# Patient Record
(demographics unavailable — no encounter records)

---

## 2024-11-05 NOTE — REASON FOR VISIT
[Cardiac Failure] : cardiac failure [Coronary Artery Disease] : coronary artery disease [FreeTextEntry1] : preoperative cardiac clearance

## 2024-11-05 NOTE — DISCUSSION/SUMMARY
[FreeTextEntry1] : 61 year old man status post VA ECMO in setting of pRCA STEMI.   CAD IWSTEMI Dec/2022 s/p LUCERO to pRCA w/ the hospital course c/b cardiogenic shock s/p VA ECMO and IABP and VT/VF s/p defibrillation. Last blood work with high TAG but didn't fast. LDL 30. Asymptomatic at this time. Residual moderate LAD and Cx disease s/p recent LHC.  -c/w aspirin, ticagrelor discontinued now more than 1 year  -NST recently done without LAD ischemia, all fixed. Cath also with non-obstructive disease.  -continue statin 40 mg daily (repeat labs)   ICM HFrEF recovered to 53% and RV normal -decrease from 25 mg to off metoprolol  -c/w losartan 25 mg daily    R axillary and brachial vein thrombosis, catheter induced -patient following with Dr. Wren from vascular   Patient to follow up in 3 months after blood work    [EKG obtained to assist in diagnosis and management of assessed problem(s)] : EKG obtained to assist in diagnosis and management of assessed problem(s)

## 2024-11-05 NOTE — HISTORY OF PRESENT ILLNESS
[FreeTextEntry1] : WEI LOPES is a 61 year old man status s/p prolonged hospital stay in 12/2022 post late presentation STEMI, ECMO cannulation in setting of refractory VT/VF, HFrEF He still has paralysis issues of vocal cords since the prolonged intubation. Considering some laser of the scar tissue of the cords.   Overall, he is doing well. Denies any chest pain. He is doing walking now, but after exerting 2 blocks like across the street.  No PND or orthopnea. Doesn't use incentive spirometer. No edema in legs.  Has started doing more exercise. Broken a sweat but does feel slightly winded due to vocal cords. Does go 20-30 minutes on a bike without issues.  Recent NST without reversible ischemia S/p ENT procedure.   He recently was admitted to [facility] with chest pain, Underwent LHC with non-obstructive CAD.  Felt to be pericarditis and sent home.  LDL 54, A1c 5.4   His last visit, metoprolol was supposed to be stopped with him remaining losartan. Unfortunately stayed on metoprolol, thus slightly elevated blood pressure.  Recent ENT surgery.

## 2024-11-05 NOTE — CARDIOLOGY SUMMARY
[de-identified] : 11/2023 NST: Perfusion: Qualitative Findings: Severe fixed inferoseptal, inferior and inferolateral wall segments to the distal inferior wall consistent with a medium sized area of infarction. No ischemia seen. Ventricular Function: The stress left ventricular EF% is 60 %. The stress end diastolic volume is 87 ml and systolic volume is 35 ml. [de-identified] : 5/2024 TTE: 1. Left ventricular cavity is normal in size. Left ventricular wall thickness is normal. Left ventricular systolic function is low normal. Regional wall motion abnormalities present.  2. Basal and mid inferior wall and basal inferoseptal segment are abnormal.  1/27/2023 TTE: Dimensions:    Normal Values: LA:     3.1    2.0 - 4.0 cm Ao:     3.1    2.0 - 3.8 cm SEPTUM: 0.9    0.6 - 1.2 cm PWT:    0.8    0.6 - 1.1 cm LVIDd:  4.3    3.0 - 5.6 cm LVIDs:  3.4    1.8 - 4.0 cm Derived variables: LVMI: 73 g/m2 RWT: 0.37 Fractional short: 21 % EF (Peace Rule): 53 %Doppler Peak Velocity (m/sec): AoV=1.3 ------------------------------------------------------------------------ Observations: Mitral Valve: Normal mitral valve. Aortic Valve/Aorta: Calcified trileaflet aortic valve with normal opening. Aortic Root: 3.1 cm. LVOT diameter: 1.9 cm. Left Atrium: Normal left atrium.  LA volume index = 16 cc/m2. Left Ventricle: Mild  segmental left ventricular systolic dysfunction. Endocardial visualization enhanced with intravenous injection of Ultrasonic Enhancing Agent (Definity). No left ventricular thrombus. The mid anterior septum, the basal anterior septum, the basal inferior wall, the mid inferior wall, the mid inferoseptum, and the basal inferoseptum are hypokinetic.  Normal left ventricular internal dimensions and wall thicknesses. Unable to evaluate diastology. Right Heart: Normal right atrium. The right ventricle is not well visualized; grossly normal right ventricular systolic function. Tricuspid valve not well visualized. Pulmonic valve not well visualized. Pericardium/Pleura: Normal pericardium with no pericardial effusion. Hemodynamic: IVC is not well visualized. ------------------------------------------------------------------------ Conclusions: 1. Mild  segmental left ventricular systolic dysfunction. Endocardial visualization enhanced with intravenous injection of Ultrasonic Enhancing Agent (Definity). No left ventricular thrombus. The mid anterior septum, the basal anterior septum, the basal inferior wall, the mid inferior wall, the mid inferoseptum, and the basal inferoseptum are hypokinetic. 2. The right ventricle is not well visualized; grossly normal right ventricular systolic function. [de-identified] : 12/7/2022 PCi:\par  \par  Conclusions: \par  1. Culprit for late presentation inferior wall MI (>24 hours old) and\par  ventricular fibrillation arrest is proximal RCA.\par  \par  2. Patient is in refractory cardiogenic shock as evidenced by SBP <80\par  on norepineprine, dobutamine, vasopressin and\par  epinephrine on ECMO support.  \par  3. Successul rescue PCI of the pRCA using 2 LUCERO with DAISY 3 flow after\par  intervention, after unsuccssful lytic therapy from OSH.\par  Recommendations: \par  \par  1. Continue cangrelor antiplatelet IV infusion therapy as well as\par  argotroban.\par  2. Continue ECMO for hemodynamic support with close HF, CTU, CTS care \par  3. Return to CTU for intensive medical care and wean pressors as\par  tolerated\par  4. Discussed with Dr. Sen of CTS and Dr. Jiménez of [facility]  \par  Acute complication:    No complications \par

## 2024-11-06 NOTE — CARDIOLOGY SUMMARY
[de-identified] : 11/2023 NST: Perfusion: Qualitative Findings: Severe fixed inferoseptal, inferior and inferolateral wall segments to the distal inferior wall consistent with a medium sized area of infarction. No ischemia seen. Ventricular Function: The stress left ventricular EF% is 60 %. The stress end diastolic volume is 87 ml and systolic volume is 35 ml. [de-identified] : 5/2024 TTE: 1. Left ventricular cavity is normal in size. Left ventricular wall thickness is normal. Left ventricular systolic function is low normal. Regional wall motion abnormalities present.  2. Basal and mid inferior wall and basal inferoseptal segment are abnormal.  1/27/2023 TTE: Dimensions:    Normal Values: LA:     3.1    2.0 - 4.0 cm Ao:     3.1    2.0 - 3.8 cm SEPTUM: 0.9    0.6 - 1.2 cm PWT:    0.8    0.6 - 1.1 cm LVIDd:  4.3    3.0 - 5.6 cm LVIDs:  3.4    1.8 - 4.0 cm Derived variables: LVMI: 73 g/m2 RWT: 0.37 Fractional short: 21 % EF (Peace Rule): 53 %Doppler Peak Velocity (m/sec): AoV=1.3 ------------------------------------------------------------------------ Observations: Mitral Valve: Normal mitral valve. Aortic Valve/Aorta: Calcified trileaflet aortic valve with normal opening. Aortic Root: 3.1 cm. LVOT diameter: 1.9 cm. Left Atrium: Normal left atrium.  LA volume index = 16 cc/m2. Left Ventricle: Mild  segmental left ventricular systolic dysfunction. Endocardial visualization enhanced with intravenous injection of Ultrasonic Enhancing Agent (Definity). No left ventricular thrombus. The mid anterior septum, the basal anterior septum, the basal inferior wall, the mid inferior wall, the mid inferoseptum, and the basal inferoseptum are hypokinetic.  Normal left ventricular internal dimensions and wall thicknesses. Unable to evaluate diastology. Right Heart: Normal right atrium. The right ventricle is not well visualized; grossly normal right ventricular systolic function. Tricuspid valve not well visualized. Pulmonic valve not well visualized. Pericardium/Pleura: Normal pericardium with no pericardial effusion. Hemodynamic: IVC is not well visualized. ------------------------------------------------------------------------ Conclusions: 1. Mild  segmental left ventricular systolic dysfunction. Endocardial visualization enhanced with intravenous injection of Ultrasonic Enhancing Agent (Definity). No left ventricular thrombus. The mid anterior septum, the basal anterior septum, the basal inferior wall, the mid inferior wall, the mid inferoseptum, and the basal inferoseptum are hypokinetic. 2. The right ventricle is not well visualized; grossly normal right ventricular systolic function. [de-identified] : 12/7/2022 PCi:\par  \par  Conclusions: \par  1. Culprit for late presentation inferior wall MI (>24 hours old) and\par  ventricular fibrillation arrest is proximal RCA.\par  \par  2. Patient is in refractory cardiogenic shock as evidenced by SBP <80\par  on norepineprine, dobutamine, vasopressin and\par  epinephrine on ECMO support.  \par  3. Successul rescue PCI of the pRCA using 2 LUCERO with DAISY 3 flow after\par  intervention, after unsuccssful lytic therapy from OSH.\par  Recommendations: \par  \par  1. Continue cangrelor antiplatelet IV infusion therapy as well as\par  argotroban.\par  2. Continue ECMO for hemodynamic support with close HF, CTU, CTS care \par  3. Return to CTU for intensive medical care and wean pressors as\par  tolerated\par  4. Discussed with Dr. Sen of CTS and Dr. Jiménez of [facility]  \par  Acute complication:    No complications \par

## 2024-11-06 NOTE — HISTORY OF PRESENT ILLNESS
[FreeTextEntry1] : WEI LOPES is a 61 year old man status s/p prolonged hospital stay in 12/2022 post late presentation STEMI, ECMO cannulation in setting of refractory VT/VF, HFrEF He still has paralysis issues of vocal cords since the prolonged intubation. Considering some laser of the scar tissue of the cords.   Overall, he is doing well. Denies any chest pain. He is doing walking now, but after exerting 2 blocks like across the street.  No PND or orthopnea. Doesn't use incentive spirometer. No edema in legs.  Has started doing more exercise. Broken a sweat but does feel slightly winded due to vocal cords. Does go 20-30 minutes on a bike without issues.  Recent NST without reversible ischemia S/p ENT procedure.   He recently was admitted to [facility] with chest pain, Underwent LHC with non-obstructive CAD.  Felt to be pericarditis and sent home.  LDL 54, A1c 5.4  His last visit, metoprolol was supposed to be stopped with him remaining losartan. Unfortunately stayed on metoprolol, thus slightly elevated blood pressure.  Recent ENT surgery.  11/6/24 Pt presents as emergent patient due to feeling  " funny" hypertensive for two days now at home which is similar to when he had his heart attack in 2022. last night hours after exercising he became diaphoretic, no CP but not feeling right, he didn't want to call 911, todays episode was on a much smaller scale. He had 4 days ago had a dilation/ scar tissue removal of trachea post being intubated in 2022. He has very high stress in his life, being unable to work, living situation is not ideal, his anxiety is very high. Pt has had elevated B/P since stopping metoprolol.

## 2024-11-06 NOTE — DISCUSSION/SUMMARY
[FreeTextEntry1] : 61 year old man status post VA ECMO in setting of pRCA STEMI.   CAD IWSTEMI Dec/2022 s/p LUCERO to pRCA w/ the hospital course c/b cardiogenic shock s/p VA ECMO and IABP and VT/VF s/p defibrillation. Last blood work with high TAG but didn't fast. LDL 30. Asymptomatic at this time. Residual moderate LAD and Cx disease s/p recent LHC.  -c/w aspirin, ticagrelor discontinued now more than 1 year  -NST recently done without LAD ischemia, all fixed. Cath also with non-obstructive disease.  -continue statin 40 mg daily (repeat labs)   ICM HFrEF recovered to 53% and RV normal -Restart  metoprolol 25mg QD  c/w losartan 25 mg daily   R axillary and brachial vein thrombosis, catheter induced -patient following with Dr. Wren from vascular    Patient to follow up in 3 months after blood work

## 2024-12-30 NOTE — ASSESSMENT
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  He again will not decide between these two options and is still asking for a third option and will reach out to Dr. Tanner again.

## 2024-12-30 NOTE — PROCEDURE
[de-identified] : Stroboscopic Laryngoscopy Procedure Note:  Indication:	Assess laryngeal biomechanics and vocal fold oscillation.  Description of Procedure:	Informed consent was verbally obtained from the patient prior to the procedure. The patient was seated in the clinic chair. Topical anesthesia was achieved by first spraying the nasal cavities with 4% lidocaine and nasal decongestant.   Findings:  Supraglottis: no masses or lesions  Glottis:    Structure:                        Right: crisp and shows no lesions or masses, supraglottic posterior glottic stenosis                       Left:  crisp and shows no lesions or masses                 Mobility:                        Right:  absent                       Left:  absent                Amplitude:                        Right:  normal                       Left:  normal                Closure: complete                 Wave symmetry:  symmetric  Subglottis: no masses or lesions within the visualized subglottis Visualized airway is widely patent.

## 2024-12-30 NOTE — HISTORY OF PRESENT ILLNESS
[de-identified] : WEI LOPES is a 62 year old male who presents to the [facility] for follow up of his bilateral vocal fold immobility, PGS, adrenal insufficiency, and prior MI. I last saw the patient on 6/4/24. He has since gotten 2nd opinion from Dr. Tanner. Breathing has been worsened since last visit. Having SOB especially worse when he wakes up in the middle of the night and during exercise. Voice has been stable but having frequent urge to clear throat. Sometimes he's able to expectorate mucous. Complains of globus when swallowing. Patient is schedule for DL, incision of possible PGS, possible steroid injection, and laryngeal dilation on 9/20/24. Tentative plan for repat surgery in approx 6 weeks post op. States he is having SOB, much worse with activity. Swallowing is getting worse. Voice is hoarse. States voice was worse after second surgery with [facility].  States was suppose to follow up 6 months after 2nd surgery (which was Nov 2024). States got minimal improvement in breathing for short period of time after 2nd surgery but now much worse.   Previously reported: bilateral vocal fold paralysis. Patient is referred by Dr. Nik Francis who first saw the patient on 4/4/23. This was patient was first identified with bilateral vocal fold immobility and instructed to go to ED but patient elected against this and instead was given oral steroid taper for stridor - currently on 7th day. States breathing is better now than 7 days prior. PMH is significant for Myocardial Infarct 12/6/22 - s/p stent placement at [facility]. States he was intubated followed by trach and decannulation. Reports he was on ECMO- and discharged home after 2/15/23. No dysphagia. Describes his voice as more hoarse. Their vocal fold paralysis is thought to be from recent hospitalization. No oxygen requirements. Their vocal fold paralysis was first noted on after being discharged from [facility]. They describe their current voice has slightly improved since initial presentation - persistent hoarseness Denies complete loss of voice - increased effort and work to speak Denies having issues drinking liquids. He has not worked with a speech language pathologist for this problem. Denies having prior procedures/surgeries on their vocal cords. 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 Healy

## 2025-01-14 NOTE — HISTORY OF PRESENT ILLNESS
[de-identified] : WEI LOPES is a 62 year old male who presents to the [facility] for follow up of his bilateral vocal fold immobility, PGS, adrenal insufficiency, and prior MI. I last saw the patient during his recent hospitalization. This is his 1st POA. Many questions were answered today and reviewed multiple times.  Several pages of instructions were written down for him to take home.     Vitals from today's visit checked by myself personally: /76 SpO2: 98% HR 76  Previously reported: bilateral vocal fold paralysis. Patient is referred by Dr. Nik Francis who first saw the patient on 4/4/23. This was patient was first identified with bilateral vocal fold immobility and instructed to go to ED but patient elected against this and instead was given oral steroid taper for stridor - currently on 7th day. States breathing is better now than 7 days prior. PMH is significant for Myocardial Infarct 12/6/22 - s/p stent placement at [facility]. States he was intubated followed by trach and decannulation. Reports he was on ECMO- and discharged home after 2/15/23. No dysphagia. Describes his voice as more hoarse. Their vocal fold paralysis is thought to be from recent hospitalization. No oxygen requirements. Their vocal fold paralysis was first noted on after being discharged from [facility]. They describe their current voice has slightly improved since initial presentation - persistent hoarseness Denies complete loss of voice - increased effort and work to speak Denies having issues drinking liquids. He has not worked with a speech language pathologist for this problem. Denies having prior procedures/surgeries on their vocal cords. 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 Healy 2024: DL, incision of posterior glottic stenosis, steroid injection; Dr. Tanner  2024: DL, incision of posterior glottic stenosis, steroid injection; Dr. Tanner  1/1/25: Tracheotomy; Dr. Healy

## 2025-01-14 NOTE — PROCEDURE
[de-identified] : Stroboscopic Laryngoscopy Procedure Note: Indication: Assess laryngeal biomechanics and vocal fold oscillation. Description of Procedure: Informed consent was verbally obtained from the patient prior to the procedure. The patient was seated in the clinic chair. Topical anesthesia was achieved by first spraying the nasal cavities with 4% lidocaine and nasal decongestant.  Findings: Supraglottis: no masses or lesions Glottis: Structure:  Right: crisp and shows no lesions or masses, supraglottic posterior glottic stenosis  Left: crisp and shows no lesions or masses  Mobility:  Right: absent  Left: absent  Amplitude:  Right: normal  Left: normal  Closure: complete  Wave symmetry: symmetric Subglottis: no masses or lesions within the visualized subglottis Visualized airway is widely patent.  Procedure: Transcervical/trans-stomal flexible tracheoscopy   Exam: The laryngoscope was first placed through the tracheotomy tube in place. This showed that the tracheostomy tube was resting in good position. There was moderate erythema of the trachealis with mild blood cacked on entirety of trachealis. The trachea was adequately dry throughout. No purulence seen.

## 2025-01-14 NOTE — PHYSICAL EXAM
[Normal] : mucosa is normal [Midline] : trachea located in midline position [de-identified] : trach faceplate in appropriate position

## 2025-01-14 NOTE — ASSESSMENT
[FreeTextEntry1] : Assessment/Plan: #1 Bilateral vocal fold immobility #2 Posterior glottic stenosis #3 Dysphonia #4 Prior MI and cardiogenic shock with chronic memory issues #5 Trach dependent #6 Anxiety #7 Presumed acute tracheitis  Patient given Passy Muri valve today. he did not feel very comfortable wearing it even though he was saturating fine and showing no signs of air stacking.  He will mikal as tolerated going forward while awake.  Went in details over all trach maintenance instructions.  Patient was extremely anxious at today's visit and showed difficulty understanding instructions.  This is his baseline due to his prior brain injury during cardiogenic shock over 1 year prior.  As I did not feel he was comfortable enough to manage this trach at this time without assistance I recommended we admit him to the hospital but he refused.  He will use nebulized saline 4-5 times a day.  Stop using red rubber catheter over a dozen times a day, instead instructed on when to use it.  For his presumed mild tracheitis we will start him on Bactrim DS bid for week and start nebulized ciprodex drops 4 drops bid.  Will follow up in 2 days.   Total time: 55 minutes; total time does not include time spent performing the procedure and its related elements. It does include all other face to face and non face to face pre and post visit tasks performed on the same date of service.

## 2025-01-16 NOTE — PROCEDURE
[de-identified] : Procedure: Transnasal flexible laryngoscopy Description: Informed consent was obtained from the patient prior to the procedure. The patient was seated in the clinic chair. Topical anesthesia was achieved by first spraying the nasal cavities with 4% lidocaine and 1% phenylephrine.   Exam: Clear vallecula, crisp epiglottis. Aryepiglottic folds: intact and symmetric bilaterally Hypopharynx: No pooling Interarytenoid space: No lesions or pachydermia False vocal folds: Symmetric and without lesions or masses. False fold voicing (plica ventricularis) is not noted True vocal folds: Immobile bilaterally. No paradoxical motion. Right medial edge is crisp and shows no lesions or masses. Left medial edge is crisp and shows no lesions or masses. Mucosal covering is minimally edematous. No erythema. No obvious vascular ectasias. Vocal processes do not demonstrate granulomas. Subglottis/proximal trachea: clear and unobstructed to the limits of the examination today.   Procedure: Transcervical/trans-stomal flexible tracheoscopy  Exam: The laryngoscope was first placed through the tracheotomy tube in place. This showed that the tracheostomy tube was resting in good position. There was moderate erythema of the trachealis with mild blood cacked on entirety of trachealis. The trachea was adequately dry throughout. No purulence seen.

## 2025-01-16 NOTE — PROCEDURE
[de-identified] : Procedure: Transnasal flexible laryngoscopy Description: Informed consent was obtained from the patient prior to the procedure. The patient was seated in the clinic chair. Topical anesthesia was achieved by first spraying the nasal cavities with 4% lidocaine and 1% phenylephrine.   Exam: Clear vallecula, crisp epiglottis. Aryepiglottic folds: intact and symmetric bilaterally Hypopharynx: No pooling Interarytenoid space: No lesions or pachydermia False vocal folds: Symmetric and without lesions or masses. False fold voicing (plica ventricularis) is not noted True vocal folds: Immobile bilaterally. No paradoxical motion. Right medial edge is crisp and shows no lesions or masses. Left medial edge is crisp and shows no lesions or masses. Mucosal covering is minimally edematous. No erythema. No obvious vascular ectasias. Vocal processes do not demonstrate granulomas. Subglottis/proximal trachea: clear and unobstructed to the limits of the examination today.   Procedure: Transcervical/trans-stomal flexible tracheoscopy  Exam: The laryngoscope was first placed through the tracheotomy tube in place. This showed that the tracheostomy tube was resting in good position. There was moderate erythema of the trachealis with mild blood cacked on entirety of trachealis. The trachea was adequately dry throughout. No purulence seen.

## 2025-01-16 NOTE — HISTORY OF PRESENT ILLNESS
[de-identified] : WEI LOPES is a 63 year old male who presents to the [facility] for follow up of his bilateral vocal fold immobility, PGS, dysphonia, prior MI and cardiogenic shock with chronic memory issues, trach dependent, anxiety, and presumed acute tracheitis.  I last saw the patient on 1/14/25. At that time started on ciprodex drops and Bactrim. Not currently using PMV. He has noticed symptoms are improving.  Continues antibiotics.   Prior Pertinent Procedures: 2/28/24: DL, CO2 laser incision of posterior glottic stenosis, laryngeal balloon dilation, steroid injection; Dr. Healy 2024: DL, incision of posterior glottic stenosis, steroid injection; Dr. Tanner 2024: DL, incision of posterior glottic stenosis, steroid injection; Dr. Tanner 1/1/25: Tracheotomy; Dr. Healy

## 2025-01-16 NOTE — HISTORY OF PRESENT ILLNESS
[de-identified] : WEI LOPES is a 63 year old male who presents to the [facility] for follow up of his bilateral vocal fold immobility, PGS, dysphonia, prior MI and cardiogenic shock with chronic memory issues, trach dependent, anxiety, and presumed acute tracheitis.  I last saw the patient on 1/14/25. At that time started on ciprodex drops and Bactrim. Not currently using PMV. He has noticed symptoms are improving.  Continues antibiotics.   Prior Pertinent Procedures: 2/28/24: DL, CO2 laser incision of posterior glottic stenosis, laryngeal balloon dilation, steroid injection; Dr. Healy 2024: DL, incision of posterior glottic stenosis, steroid injection; Dr. Tanner 2024: DL, incision of posterior glottic stenosis, steroid injection; Dr. Tanner 1/1/25: Tracheotomy; Dr. Healy

## 2025-01-16 NOTE — ASSESSMENT
[FreeTextEntry1] : Assessment/Plan: #1 Bilateral vocal fold immobility #2 Posterior glottic stenosis #3 Dysphonia #4 Prior MI and cardiogenic shock with chronic memory issues #5 Trach dependent #6 Anxiety #7 Presumed acute tracheitis  Recommended he continue the cipro-dex and finish the course of Bactrim. I will see him back on Thursday (1 week). Will continue nebulized saline and humidified air at night.

## 2025-01-23 NOTE — PROCEDURE
[de-identified] : Procedure: Transcervical/trans-stomal flexible tracheoscopy   Exam: The laryngoscope was first placed through the tracheotomy tube in place. This showed that the tracheostomy tube was resting in good position. There was mild erythema of the trachea distal to the tracheostomy tube. The trachea was adequately moist. the laryngoscope evaluated the entirety of the trachea and bilateral mainstem bronchi and no masses or lesions were seen.

## 2025-01-23 NOTE — HISTORY OF PRESENT ILLNESS
[de-identified] :  WEI LOPES is a 63 year old male who presents to the [facility] for follow up of his bilateral vocal fold immobility, PGS, dysphonia, prior MI and cardiogenic shock with chronic memory issues, trach dependent, anxiety, and presumed acute tracheitis. I last saw the patient on 1/16/25. Pt was to continue cipro-dex and finish bactrim. He was to continue nebulized saline and humidified air as well.  Completed Bactrim and continues ciprodex drops with great relief  States breathing and voice has greatly improved. Denies dysphagia. Less bleeding from trach. Still has some white mucous on inner cannula. No foul smelling odors. No recent fevers. Has used PMV with SLP. States voice is stable, requires increased effort to speak.   Prior Pertinent Procedures: 2/28/24: DL, CO2 laser incision of posterior glottic stenosis, laryngeal balloon dilation, steroid injection; Dr. Healy 2024: DL, incision of posterior glottic stenosis, steroid injection; Dr. Tanner 2024: DL, incision of posterior glottic stenosis, steroid injection; Dr. Tanner 1/1/25: Tracheotomy; Dr. Healy

## 2025-01-23 NOTE — PHYSICAL EXAM
[Normal] : mucosa is normal [Midline] : trachea located in midline position [de-identified] : trach plate in place

## 2025-01-23 NOTE — HISTORY OF PRESENT ILLNESS
[de-identified] :  WEI LOPES is a 63 year old male who presents to the [facility] for follow up of his bilateral vocal fold immobility, PGS, dysphonia, prior MI and cardiogenic shock with chronic memory issues, trach dependent, anxiety, and presumed acute tracheitis. I last saw the patient on 1/16/25. Pt was to continue cipro-dex and finish bactrim. He was to continue nebulized saline and humidified air as well.  Completed Bactrim and continues ciprodex drops with great relief  States breathing and voice has greatly improved. Denies dysphagia. Less bleeding from trach. Still has some white mucous on inner cannula. No foul smelling odors. No recent fevers. Has used PMV with SLP. States voice is stable, requires increased effort to speak.   Prior Pertinent Procedures: 2/28/24: DL, CO2 laser incision of posterior glottic stenosis, laryngeal balloon dilation, steroid injection; Dr. Healy 2024: DL, incision of posterior glottic stenosis, steroid injection; Dr. Tanner 2024: DL, incision of posterior glottic stenosis, steroid injection; Dr. Tanner 1/1/25: Tracheotomy; Dr. Healy

## 2025-01-23 NOTE — PHYSICAL EXAM
[Normal] : mucosa is normal [Midline] : trachea located in midline position [de-identified] : trach plate in place

## 2025-01-23 NOTE — ASSESSMENT
[FreeTextEntry1] : Assessment/Plan: #1 Bilateral vocal fold immobility #2 Posterior glottic stenosis #3 Dysphonia #4 Prior MI and cardiogenic shock with chronic memory issues #5 Trach dependent #6 Anxiety #7 Presumed acute tracheitis  Will continue nebulized saline and humidified air at night. I would like him to continue ciprodex nebulizer for 2 more weeks and then stop. I will see him back in 4 weeks. We will order for size 6 cuffless Shiley trach to be delivered every 2 months.   Total time: 30 minutes; total time does not include time spent performing the procedure and its related elements. It does include all other face to face and non face to face pre and post visit tasks performed on the same date of service.

## 2025-01-23 NOTE — PROCEDURE
[de-identified] : Procedure: Transcervical/trans-stomal flexible tracheoscopy   Exam: The laryngoscope was first placed through the tracheotomy tube in place. This showed that the tracheostomy tube was resting in good position. There was mild erythema of the trachea distal to the tracheostomy tube. The trachea was adequately moist. the laryngoscope evaluated the entirety of the trachea and bilateral mainstem bronchi and no masses or lesions were seen.

## 2025-02-24 NOTE — HISTORY OF PRESENT ILLNESS
[de-identified] : WEI LOPES is a 63 year old male who presents to the [facility] for follow up of his bilateral vocal fold immobility, PGS, dysphonia, prior MI and cardiogenic shock with chronic memory issues, trach dependent, and anxiety. I last saw the patient on 1/23/25. He was to continue on nebulized saline and humidified air at night. I wanted him to continue ciprodex nebulizer, weaning off. I wanted to see him back in 4 weeks. We ordered for size 6 cuffless Shiley trach to be delivered every 2 months. Reports some discomfort from trach and sees green, sometimes bloody, excaudate on dressing during changes. Pending speech therapy on 3/14. States voice remains the same from last visit.  Prior Pertinent Procedures: 2/28/24: DL, CO2 laser incision of posterior glottic stenosis, laryngeal balloon dilation, steroid injection; Dr. Healy 2024: DL, incision of posterior glottic stenosis, steroid injection; Dr. Tanner 2024: DL, incision of posterior glottic stenosis, steroid injection; Dr. Tanner 1/1/25: Tracheotomy; Dr. Healy

## 2025-02-24 NOTE — HISTORY OF PRESENT ILLNESS
[de-identified] : WEI LOPES is a 63 year old male who presents to the [facility] for follow up of his bilateral vocal fold immobility, PGS, dysphonia, prior MI and cardiogenic shock with chronic memory issues, trach dependent, and anxiety. I last saw the patient on 1/23/25. He was to continue on nebulized saline and humidified air at night. I wanted him to continue ciprodex nebulizer, weaning off. I wanted to see him back in 4 weeks. We ordered for size 6 cuffless Shiley trach to be delivered every 2 months. Reports some discomfort from trach and sees green, sometimes bloody, excaudate on dressing during changes. Pending speech therapy on 3/14. States voice remains the same from last visit.  Prior Pertinent Procedures: 2/28/24: DL, CO2 laser incision of posterior glottic stenosis, laryngeal balloon dilation, steroid injection; Dr. Healy 2024: DL, incision of posterior glottic stenosis, steroid injection; Dr. Tanner 2024: DL, incision of posterior glottic stenosis, steroid injection; Dr. Tanner 1/1/25: Tracheotomy; Dr. Healy

## 2025-02-24 NOTE — ASSESSMENT
[FreeTextEntry1] : Assessment/Plan: #1 Bilateral vocal fold immobility #2 Posterior glottic stenosis #3 Dysphonia #4 Prior MI and cardiogenic shock with chronic memory issues #5 Trach dependent #6 Anxiety  We will order new supplies for him. I want to see him back once trach tube delivered. He may stop ciprodex once runs out.  Will continue nebulized saline every 3rd day.

## 2025-02-24 NOTE — PROCEDURE
[de-identified] : Procedure: Transnasal flexible laryngoscopy Description: Informed consent was obtained from the patient prior to the procedure. The patient was seated in the clinic chair. Topical anesthesia was achieved by first spraying the nasal cavities with 4% lidocaine and 1% phenylephrine.   Exam: Clear vallecula, crisp epiglottis. Aryepiglottic folds: intact and symmetric bilaterally Hypopharynx: No pooling Interarytenoid space: No lesions or pachydermia False vocal folds: Symmetric and without lesions or masses. False fold voicing (plica ventricularis) is not noted True vocal folds: Absent vocal fold motion bilaterally. No paradoxical motion. Right medial edge is crisp and shows no lesions or masses. Left medial edge is crisp and shows no lesions or masses. Mucosal covering is minimally edematous. No erythema. No obvious vascular ectasias. Vocal processes do not demonstrate granulomas. Subglottis/proximal trachea: clear and unobstructed to the limits of the examination today. Other: Posterior commissure scar

## 2025-02-24 NOTE — PROCEDURE
[de-identified] : Procedure: Transnasal flexible laryngoscopy Description: Informed consent was obtained from the patient prior to the procedure. The patient was seated in the clinic chair. Topical anesthesia was achieved by first spraying the nasal cavities with 4% lidocaine and 1% phenylephrine.   Exam: Clear vallecula, crisp epiglottis. Aryepiglottic folds: intact and symmetric bilaterally Hypopharynx: No pooling Interarytenoid space: No lesions or pachydermia False vocal folds: Symmetric and without lesions or masses. False fold voicing (plica ventricularis) is not noted True vocal folds: Absent vocal fold motion bilaterally. No paradoxical motion. Right medial edge is crisp and shows no lesions or masses. Left medial edge is crisp and shows no lesions or masses. Mucosal covering is minimally edematous. No erythema. No obvious vascular ectasias. Vocal processes do not demonstrate granulomas. Subglottis/proximal trachea: clear and unobstructed to the limits of the examination today. Other: Posterior commissure scar

## 2025-03-07 NOTE — HISTORY OF PRESENT ILLNESS
[FreeTextEntry1] : WEI LOPES is a 61 year old man status s/p prolonged hospital stay in 12/2022 post late presentation STEMI, ECMO cannulation in setting of refractory VT/VF, HFrEF He still has paralysis issues of vocal cords since the prolonged intubation. Considering some laser of the scar tissue of the cords.   Overall, he is doing well. Denies any chest pain. He is doing walking now, but after exerting 2 blocks like across the street.  No PND or orthopnea. Doesn't use incentive spirometer. No edema in legs.  Has started doing more exercise. Broken a sweat but does feel slightly winded due to vocal cords. Does go 20-30 minutes on a bike without issues.  Recent NST without reversible ischemia S/p ENT procedure.   He recently was admitted to [facility] with chest pain, Underwent LHC with non-obstructive CAD.  Felt to be pericarditis and sent home.  LDL 54, A1c 5.4  His last visit, metoprolol was supposed to be stopped with him remaining losartan. Unfortunately stayed on metoprolol, thus slightly elevated blood pressure.  Recent ENT surgery.  11/6/24 Pt presents as emergent patient due to feeling  " funny" hypertensive for two days now at home which is similar to when he had his heart attack in 2022. last night hours after exercising he became diaphoretic, no CP but not feeling right, he didn't want to call 911, todays episode was on a much smaller scale. He had 4 days ago had a dilation/ scar tissue removal of trachea post being intubated in 2022. He has very high stress in his life, being unable to work, living situation is not ideal, his anxiety is very high. Pt has had elevated B/P since stopping metoprolol.    Recent hospital stay where he diana to [facility] S/P tracheostomy tube placement in the setting of unable to breath.

## 2025-03-07 NOTE — DISCUSSION/SUMMARY
[FreeTextEntry1] : 61 year old man status post [facility] ECMO in setting of pRCA STEMI.   CAD IWSTEMI Dec/2022 s/p LUCERO to pRCA w/ the hospital course c/b cardiogenic shock s/p VA ECMO and IABP and VT/VF s/p defibrillation. Last blood work with high TAG but didn't fast. LDL 30. Asymptomatic at this time. Residual moderate LAD and Cx disease s/p recent LHC.  -c/w aspirin, ticagrelor discontinued now more than 1 year  -NST recently done without LAD ischemia, all fixed. Cath also with non-obstructive disease.  -continue statin 40 mg daily (repeat labs)   ICM HFrEF recovered to 53% and RV normal -Restart  metoprolol 25mg QD  c/w losartan 25 mg daily   R axillary and brachial vein thrombosis, catheter induced -patient following with Dr. Wren from vascular    Patient to follow up in 3 months after blood work    [EKG obtained to assist in diagnosis and management of assessed problem(s)] : EKG obtained to assist in diagnosis and management of assessed problem(s)

## 2025-03-07 NOTE — CARDIOLOGY SUMMARY
[de-identified] : 11/2023 NST: Perfusion: Qualitative Findings: Severe fixed inferoseptal, inferior and inferolateral wall segments to the distal inferior wall consistent with a medium sized area of infarction. No ischemia seen. Ventricular Function: The stress left ventricular EF% is 60 %. The stress end diastolic volume is 87 ml and systolic volume is 35 ml. [de-identified] : 5/2024 TTE: 1. Left ventricular cavity is normal in size. Left ventricular wall thickness is normal. Left ventricular systolic function is low normal. Regional wall motion abnormalities present.  2. Basal and mid inferior wall and basal inferoseptal segment are abnormal.  1/27/2023 TTE: Dimensions:    Normal Values: LA:     3.1    2.0 - 4.0 cm Ao:     3.1    2.0 - 3.8 cm SEPTUM: 0.9    0.6 - 1.2 cm PWT:    0.8    0.6 - 1.1 cm LVIDd:  4.3    3.0 - 5.6 cm LVIDs:  3.4    1.8 - 4.0 cm Derived variables: LVMI: 73 g/m2 RWT: 0.37 Fractional short: 21 % EF (Peace Rule): 53 %Doppler Peak Velocity (m/sec): AoV=1.3 ------------------------------------------------------------------------ Observations: Mitral Valve: Normal mitral valve. Aortic Valve/Aorta: Calcified trileaflet aortic valve with normal opening. Aortic Root: 3.1 cm. LVOT diameter: 1.9 cm. Left Atrium: Normal left atrium.  LA volume index = 16 cc/m2. Left Ventricle: Mild  segmental left ventricular systolic dysfunction. Endocardial visualization enhanced with intravenous injection of Ultrasonic Enhancing Agent (Definity). No left ventricular thrombus. The mid anterior septum, the basal anterior septum, the basal inferior wall, the mid inferior wall, the mid inferoseptum, and the basal inferoseptum are hypokinetic.  Normal left ventricular internal dimensions and wall thicknesses. Unable to evaluate diastology. Right Heart: Normal right atrium. The right ventricle is not well visualized; grossly normal right ventricular systolic function. Tricuspid valve not well visualized. Pulmonic valve not well visualized. Pericardium/Pleura: Normal pericardium with no pericardial effusion. Hemodynamic: IVC is not well visualized. ------------------------------------------------------------------------ Conclusions: 1. Mild  segmental left ventricular systolic dysfunction. Endocardial visualization enhanced with intravenous injection of Ultrasonic Enhancing Agent (Definity). No left ventricular thrombus. The mid anterior septum, the basal anterior septum, the basal inferior wall, the mid inferior wall, the mid inferoseptum, and the basal inferoseptum are hypokinetic. 2. The right ventricle is not well visualized; grossly normal right ventricular systolic function. [de-identified] : 12/7/2022 PCi:\par  \par  Conclusions: \par  1. Culprit for late presentation inferior wall MI (>24 hours old) and\par  ventricular fibrillation arrest is proximal RCA.\par  \par  2. Patient is in refractory cardiogenic shock as evidenced by SBP <80\par  on norepineprine, dobutamine, vasopressin and\par  epinephrine on ECMO support.  \par  3. Successul rescue PCI of the pRCA using 2 LUCERO with DAISY 3 flow after\par  intervention, after unsuccssful lytic therapy from OSH.\par  Recommendations: \par  \par  1. Continue cangrelor antiplatelet IV infusion therapy as well as\par  argotroban.\par  2. Continue ECMO for hemodynamic support with close HF, CTU, CTS care \par  3. Return to CTU for intensive medical care and wean pressors as\par  tolerated\par  4. Discussed with Dr. Sen of CTS and Dr. Jiménez of [facility]  \par  Acute complication:    No complications \par

## 2025-03-13 NOTE — ASSESSMENT
[FreeTextEntry1] : CLINICAL VOICE EVALUATION REPORT   Date of Evaluation: 25  Patient Name:  Venkat Rodriguez   : 1961  Primary Diagnosis: Dysphonia    Treatment Diagnosis: Dysphonia    Referring Physician:  Dr. Healy       Pertinent Background Information: Venkat Rodriguez is a 63-year-old male who was referred for a voice evaluation and therapy by his laryngologist Dr. Healy d/t recent tracheotomy.    History of Present Illness: Mr. Rodriguez was received alert, cooperative, and in no apparent distress upon arrival to today's evaluation with Trach #6 Shiley, cuffless, in place. Per charting and patient report, the patient's PMHx is significant for bilateral vocal fold immobility, PGS, dysphonia, Myocardial Infarct 22 s/p stent placement and cardiogenic shock with chronic memory issues, trach dependent, and anxiety. The patient is now s/p tracheotomy with Dr. Healy on 25. The patient was provided medical clearance to utilize PMV speaking valve for all waking hours following tracheotomy by physician. The patient reports he had difficulty using speaking valve initially and following a short course of speech therapy at home, he is now using speaking valve during all awake hours and is able to voice with more ease. He reports he continues to note intermittent hoarse vocal quality, some effort to phonate, and intermittent dry throat/irritation. Per charting and patient report, he consumes an exclusive oral diet of soft solids and thin liquids without difficulty and no recent PNA.      Current nutritional intake: soft solids and thin liquids    Medical History, per EMR:  Active Problems Abnormal CT scan of lung (793.19) (R91.8) Acute tracheitis (464.10) (J04.10) Acute tracheitis (464.10) (J04.10) Adrenal insufficiency (255.41) (E27.40) BPH with obstruction/lower urinary tract symptoms (600.01,599.69) (N40.1,N13.8) Carpal tunnel syndrome, unspecified laterality (354.0) (G56.00) Dysphagia (787.20) (R13.10) Dysphonia (784.42) (R49.0) Hematospermia (608.82) (R36.1) History of DVT (deep vein thrombosis) (V12.51) (Z86.718) Hypertension, benign (401.1) (I10) Multiple benign nevi (216.9) (D22.9) Myocardial infarct (410.90) (I21.9) Pain in both wrists (719.43) (M25.531,M25.532) Pain in hand and fingers (729.5) (M79.643,M79.646) Personal history of ECMO (V15.87) (Z92.81) Posterior glottic stenosis (478.74) (J38.6) Screening PSA (prostate specific antigen) (V76.44) (Z12.5) Seborrheic keratosis (702.19) (L82.1) ST elevation myocardial infarction (STEMI) involving other coronary artery (410.10) (I21.29) Stiffness of finger joint, unspecified laterality (719.54) (M25.649) Strain of hand, right (842.10) (S66.911A) Suppurative tenosynovitis of flexor tendon of left hand (727.05) (M65.142) Tracheostomy dependence (V44.0) (Z93.0) Trigger finger, acquired (727.03) (M65.30) Vocal fold paralysis, bilateral (478.33) (J38.02)       Past Medical History History of Exercise-induced asthma (493.81) (J45.990) History of airway obstruction (V12.69) (Z87.09) History of deep venous thrombosis (V12.51) (Z86.718) History of gross hematuria (V13.09) (Z87.898) History of high cholesterol (V12.29) (Z86.39) History of hoarseness (V13.9) (Z87.898) History of hypertension (V12.59) (Z86.79) History of shortness of breath (V13.89) (Z87.898) History of vocal cord paralysis (V12.69) (Z87.09) History of Pre-procedural laboratory examination (V72.63) (Z01.812) History of Stridor (786.1) (R06.1) History of Trigger finger, acquired (727.03) (M65.30)               CLINICAL FINDINGS:      Perceptual Voice Analysis   Vocal Quality: Hoarse, breathy, strained/strangled  Severity: Mild-moderate   Loudness Level: Reduced  Musculoskeletal Tension: Present              Location: Neck, shoulders, clavicle  Respiration: Upper thoracic/clavicular  Resonance: WFL  Tone Focus: Back  Type of Onset: breathy/hoarse    The Patient presents with Shiley Tach #6, cuffless, in place with speaking valve on trach at baseline.  For the purposes of today's assessment, a digital pulse ox was placed on his right index finger to allow for oxygen monitoring. At baseline, the patient presents with SaO2 Level to be at 96-97% which stayed consistent throughout.     Upon removal of speaking valve, the patient demonstrated ability to voice over trach; however vocal quality was breathy, strained/harsh, and significantly reduced respiratory-phonatory coordination was noted at the conversational level. With speaking valve placed on trach, the patient demonstrated improved vocal intensity and respiratory-phonatory coordination; however he continued to present with a clavicular breathing pattern, intermittent hoarse/breathy/strained vocal quality with lower vocal volume during automatic sequences, structured paragraph length reading, and conversational speech. Max phonation time was reduced (11 seconds).     IMPRESSIONS : Dysphonia    PROGNOSIS: Good with therapeutic intervention and adherence to HEP     RECOMMENDATIONS:  Based upon the evaluation results it is recommended that the patient be enrolled in a course of voice therapy to address the above areas of concern and assess if improvement in overall phonatory functioning can be achieved. Plan for patient to improve respiratory-phonatory coordination, sustained phonation, and during conversational exchanges with Speaking Valve during subsequent sessions.    1. Continue to use Speaking valve over trach during all waking hours, as tolerated.   2. Voice therapy is recommended 1x/week for approximately 8 weeks to address the above areas of concern and assess if improvement in overall phonatory functioning can be achieved.  3. Follow up with physician as directed     EDUCATION: Verbal education provided to patient for all recommendations and plan of care.     GOALS:   1. The patient will reduce vocal tension/strain by decreasing upper musculoskeletal tension via relaxation exercises with 100% accuracy independently.  2. The patient will establish volitional control of respiration evidenced by use of diaphragmatic breathing in structured tasks with 90% accuracy with minimal verbal cues.  3. The patient apply learned vocal techniques (i.e. easy onset) to achieve functional and healthy phonation with 90% success with minimal verbal cues.      Please contact the Center should you have any additional questions or concerns, at [phone].     Mel Diaz M.A. CCC-SLP  Speech Language Pathologist  [facility] [place]

## 2025-03-18 NOTE — HISTORY OF PRESENT ILLNESS
[de-identified] :  WEI LOPES is a 63 year old male who presents to the [facility] for follow up of his bilateral vocal fold immobility, PGS, dysphonia, prior MI and cardiogenic shock with chronic memory issues, trach dependent, and anxiety. I last saw the patient on 2/24/25. Pt was to finish ciprodex drops until it ran out and nebulized saline. We will replace trach in April, but pt follows up today as he is still having some discomfort with the trach. Here today for trach change.  no longer using cipro drops to trach.  States trach discomfort fluctuates, depending on the day. Some night he wakes up in pain and takes Tylenol.  He is not sure if its positional- trying to evaluate head of bed when sleeping  Breathing has been stable, no abnormal sob noted  Using passy esther valve daily  Denies bleeding, bruising, swelling around the trach. foul smelling secretions and recent infections.   Prior Pertinent Procedures: 2/28/24: DL, CO2 laser incision of posterior glottic stenosis, laryngeal balloon dilation, steroid injection; Dr. Healy 2024: DL, incision of posterior glottic stenosis, steroid injection; Dr. Tanner 2024: DL, incision of posterior glottic stenosis, steroid injection; Dr. Tanner 1/1/25: Tracheotomy; Dr. Healy

## 2025-03-18 NOTE — PHYSICAL EXAM
[Normal] : temporomandibular joint is normal [de-identified] : trach faceplate sitting in good position

## 2025-03-18 NOTE — ASSESSMENT
[FreeTextEntry1] : Assessment/Plan: #1 Bilateral vocal fold immobility #2 Posterior glottic stenosis #3 Dysphonia #4 Prior MI and cardiogenic shock with chronic memory issues #5 Trach dependent #6 Anxiety  Will order for Posey trach times [id number] medium.  Will follow up in 3 months for trach change.  Patient to start ciprodex drops around trach stoma for mild granulation tissue. The risks, benefits, and alternatives to care were discussed with the patient and understanding expressed. Patient should continue until seen by me next visit.

## 2025-03-18 NOTE — PHYSICAL EXAM
Referral faxed to [phone] [Normal] : temporomandibular joint is normal [de-identified] : trach faceplate sitting in good position

## 2025-03-18 NOTE — HISTORY OF PRESENT ILLNESS
[de-identified] :  WEI LOPES is a 63 year old male who presents to the [facility] for follow up of his bilateral vocal fold immobility, PGS, dysphonia, prior MI and cardiogenic shock with chronic memory issues, trach dependent, and anxiety. I last saw the patient on 2/24/25. Pt was to finish ciprodex drops until it ran out and nebulized saline. We will replace trach in April, but pt follows up today as he is still having some discomfort with the trach. Here today for trach change.  no longer using cipro drops to trach.  States trach discomfort fluctuates, depending on the day. Some night he wakes up in pain and takes Tylenol.  He is not sure if its positional- trying to evaluate head of bed when sleeping  Breathing has been stable, no abnormal sob noted  Using passy esther valve daily  Denies bleeding, bruising, swelling around the trach. foul smelling secretions and recent infections.   Prior Pertinent Procedures: 2/28/24: DL, CO2 laser incision of posterior glottic stenosis, laryngeal balloon dilation, steroid injection; Dr. Healy 2024: DL, incision of posterior glottic stenosis, steroid injection; Dr. Tanner 2024: DL, incision of posterior glottic stenosis, steroid injection; Dr. Tanner 1/1/25: Tracheotomy; Dr. Healy

## 2025-03-18 NOTE — PROCEDURE
[de-identified] : Procedure: Transcervical/trans-stomal flexible tracheoscopy   Exam: The laryngoscope was first placed through the tracheotomy tube in place. This showed that the tracheostomy tube was resting in good position. There was mild erythema of the trachea distal to the tracheostomy tube. The trachea was adequately moist. the laryngoscope evaluated the entirety of the trachea and bilateral mainstem bronchi and no masses or lesions were seen.  Trach changed to new 6 flex cuffless Shiley. Small granulation tisue circumfrentially around stoma.

## 2025-03-18 NOTE — REVIEW OF SYSTEMS
[As Noted in HPI] : as noted in HPI [Negative] : Heme/Lymph Gall stones    History of hysterectomy  for bleeding  Hx of cholecystectomy

## 2025-03-18 NOTE — PROCEDURE
[de-identified] : Procedure: Transcervical/trans-stomal flexible tracheoscopy   Exam: The laryngoscope was first placed through the tracheotomy tube in place. This showed that the tracheostomy tube was resting in good position. There was mild erythema of the trachea distal to the tracheostomy tube. The trachea was adequately moist. the laryngoscope evaluated the entirety of the trachea and bilateral mainstem bronchi and no masses or lesions were seen.  Trach changed to new 6 flex cuffless Shiley. Small granulation tisue circumfrentially around stoma.

## 2025-06-18 NOTE — HISTORY OF PRESENT ILLNESS
[de-identified] :  WEI LOPES is a 64 year old male who presents to the [facility] for follow up of his bilateral vocal fold immobility, PGS, dysphonia, prior MI and cardiogenic shock with chronic memory issues, trach dependent, and anxiety. I last saw the patient on 3/18/25. Pt was to start ciprodex drops around trach stoma for granulation tissue and follow up in 3mths for trach change.   Prior Pertinent Procedures: 2/28/24: DL, CO2 laser incision of posterior glottic stenosis, laryngeal balloon dilation, steroid injection; Dr. Healy 2024: DL, incision of posterior glottic stenosis, steroid injection; Dr. Tanner 2024: DL, incision of posterior glottic stenosis, steroid injection; Dr. Tanner 1/1/25: Tracheotomy; Dr. Healy

## 2025-06-24 NOTE — REASON FOR VISIT
[Cardiac Failure] : cardiac failure [Coronary Artery Disease] : coronary artery disease [FreeTextEntry1] : follow up

## 2025-06-24 NOTE — HISTORY OF PRESENT ILLNESS
[FreeTextEntry1] : WEI LOPES is a 61 year old man status s/p prolonged hospital stay in 12/2022 post late presentation STEMI, ECMO cannulation in setting of refractory VT/VF, HFrEF He still has paralysis issues of vocal cords since the prolonged intubation. Considering some laser of the scar tissue of the cords.   Overall, he is doing well. Denies any chest pain. He is doing walking now, but after exerting 2 blocks like across the street.  No PND or orthopnea. Doesn't use incentive spirometer. No edema in legs.  Has started doing more exercise. Broken a sweat but does feel slightly winded due to vocal cords. Does go 20-30 minutes on a bike without issues.  Recent NST without reversible ischemia S/p ENT procedure.   He recently was admitted to [facility] with chest pain, Underwent LHC with non-obstructive CAD.  Felt to be pericarditis and sent home.  LDL 54, A1c 5.4  His last visit, metoprolol was supposed to be stopped with him remaining losartan. Unfortunately stayed on metoprolol, thus slightly elevated blood pressure.  Recent ENT surgery.  11/6/24 Pt presents as emergent patient due to feeling  " funny" hypertensive for two days now at home which is similar to when he had his heart attack in 2022. last night hours after exercising he became diaphoretic, no CP but not feeling right, he didn't want to call 911, todays episode was on a much smaller scale. He had 4 days ago had a dilation/ scar tissue removal of trachea post being intubated in 2022. He has very high stress in his life, being unable to work, living situation is not ideal, his anxiety is very high. Pt has had elevated B/P since stopping metoprolol.    Recent hospital stay where he diana to [facility] S/P tracheostomy tube placement in the setting of unable to breath.

## 2025-06-24 NOTE — CARDIOLOGY SUMMARY
[de-identified] : 11/2023 NST: Perfusion: Qualitative Findings: Severe fixed inferoseptal, inferior and inferolateral wall segments to the distal inferior wall consistent with a medium sized area of infarction. No ischemia seen. Ventricular Function: The stress left ventricular EF% is 60 %. The stress end diastolic volume is 87 ml and systolic volume is 35 ml. [de-identified] : 5/2024 TTE: 1. Left ventricular cavity is normal in size. Left ventricular wall thickness is normal. Left ventricular systolic function is low normal. Regional wall motion abnormalities present.  2. Basal and mid inferior wall and basal inferoseptal segment are abnormal.  1/27/2023 TTE: Dimensions:    Normal Values: LA:     3.1    2.0 - 4.0 cm Ao:     3.1    2.0 - 3.8 cm SEPTUM: 0.9    0.6 - 1.2 cm PWT:    0.8    0.6 - 1.1 cm LVIDd:  4.3    3.0 - 5.6 cm LVIDs:  3.4    1.8 - 4.0 cm Derived variables: LVMI: 73 g/m2 RWT: 0.37 Fractional short: 21 % EF (Peace Rule): 53 %Doppler Peak Velocity (m/sec): AoV=1.3 ------------------------------------------------------------------------ Observations: Mitral Valve: Normal mitral valve. Aortic Valve/Aorta: Calcified trileaflet aortic valve with normal opening. Aortic Root: 3.1 cm. LVOT diameter: 1.9 cm. Left Atrium: Normal left atrium.  LA volume index = 16 cc/m2. Left Ventricle: Mild  segmental left ventricular systolic dysfunction. Endocardial visualization enhanced with intravenous injection of Ultrasonic Enhancing Agent (Definity). No left ventricular thrombus. The mid anterior septum, the basal anterior septum, the basal inferior wall, the mid inferior wall, the mid inferoseptum, and the basal inferoseptum are hypokinetic.  Normal left ventricular internal dimensions and wall thicknesses. Unable to evaluate diastology. Right Heart: Normal right atrium. The right ventricle is not well visualized; grossly normal right ventricular systolic function. Tricuspid valve not well visualized. Pulmonic valve not well visualized. Pericardium/Pleura: Normal pericardium with no pericardial effusion. Hemodynamic: IVC is not well visualized. ------------------------------------------------------------------------ Conclusions: 1. Mild  segmental left ventricular systolic dysfunction. Endocardial visualization enhanced with intravenous injection of Ultrasonic Enhancing Agent (Definity). No left ventricular thrombus. The mid anterior septum, the basal anterior septum, the basal inferior wall, the mid inferior wall, the mid inferoseptum, and the basal inferoseptum are hypokinetic. 2. The right ventricle is not well visualized; grossly normal right ventricular systolic function. [de-identified] : 12/7/2022 PCi:\par  \par  Conclusions: \par  1. Culprit for late presentation inferior wall MI (>24 hours old) and\par  ventricular fibrillation arrest is proximal RCA.\par  \par  2. Patient is in refractory cardiogenic shock as evidenced by SBP <80\par  on norepineprine, dobutamine, vasopressin and\par  epinephrine on ECMO support.  \par  3. Successul rescue PCI of the pRCA using 2 LUCERO with DAISY 3 flow after\par  intervention, after unsuccssful lytic therapy from OSH.\par  Recommendations: \par  \par  1. Continue cangrelor antiplatelet IV infusion therapy as well as\par  argotroban.\par  2. Continue ECMO for hemodynamic support with close HF, CTU, CTS care \par  3. Return to CTU for intensive medical care and wean pressors as\par  tolerated\par  4. Discussed with Dr. Sen of CTS and Dr. Jiménez of [facility]  \par  Acute complication:    No complications \par

## 2025-06-24 NOTE — DISCUSSION/SUMMARY
[FreeTextEntry1] : 61 year old man status post VA ECMO in setting of pRCA STEMI.   CAD IWSTEMI Dec/2022 s/p LUCERO to pRCA w/ the hospital course c/b cardiogenic shock s/p VA ECMO and IABP and VT/VF s/p defibrillation. Last blood work with high TAG but didn't fast. LDL 30. Asymptomatic at this time. Residual moderate LAD and Cx disease s/p recent LHC.  -c/w aspirin, ticagrelor discontinued now more than 1 year  -NST recently done without LAD ischemia, all fixed. Cath also with non-obstructive disease.  -continue statin 40 mg daily (repeat labs)   ICM HFrEF recovered to 53% and RV normal -c/w metoprolol 25mg QD   Adrenal Insufficiency  On hydrocortisone  Advised patient to follow up with Dr. Garfield MCKENZIE axillary and brachial vein thrombosis, catheter induced -patient following with Dr. Wren from vascular   Patient to follow up in 6-12 months after blood work    [EKG obtained to assist in diagnosis and management of assessed problem(s)] : EKG obtained to assist in diagnosis and management of assessed problem(s)

## 2025-07-01 NOTE — HISTORY OF PRESENT ILLNESS
[de-identified] : WEI LOPES is a 64 year old male who presents to the [facility] for follow up of his bilateral vocal fold immobility, PGS, dysphonia, prior MI and cardiogenic shock with chronic memory issues, trach dependent, and anxiety. I last saw the patient on 3/18/25. Pt was to start ciprodex drops around trach stoma for granulation tissue and follow up in 3mths for trach change. Using saline nebs daily and continues ciprodex drops to trach stoma  Breathing is overall stable- staying active, riding his bike and walking everyday.  Voice fluctuates, some days better than others.  Here today for trach change  Denies bleeding, bruising, swelling around the trach and foul smelling secretions around trach.   Prior Pertinent Procedures: 2/28/24: DL, CO2 laser incision of posterior glottic stenosis, laryngeal balloon dilation, steroid injection; Dr. Healy 2024: DL, incision of posterior glottic stenosis, steroid injection; Dr. Tanner 2024: DL, incision of posterior glottic stenosis, steroid injection; Dr. Tanner 1/1/25: Tracheotomy; Dr. Healy

## 2025-07-01 NOTE — PROCEDURE
[FreeTextEntry3] : Trach changed to new 6 flex cuffless Shiley. Small granulation tissue circumferentially around stoma. [de-identified] : Procedure: Transnasal flexible laryngoscopy Description: Informed consent was obtained from the patient prior to the procedure. The patient was seated in the clinic chair. Topical anesthesia was achieved by first spraying the nasal cavities with 4% lidocaine and 1% phenylephrine.   Exam: Clear vallecula, crisp epiglottis. Aryepiglottic folds: intact and symmetric bilaterally Hypopharynx: No pooling Interarytenoid space: No lesions or pachydermia False vocal folds: Symmetric and without lesions or masses. False fold voicing (plica ventricularis) is not noted True vocal folds: no vocal fold motion bilaterally. No paradoxical motion. Right medial edge is crisp and shows no lesions or masses. Left medial edge is crisp and shows no lesions or masses. Mucosal covering is minimally edematous. No erythema. No obvious vascular ectasias. Vocal processes do not demonstrate granulomas. Interarytenoid scar Subglottis/proximal trachea: clear and unobstructed to the limits of the examination today.

## 2025-07-01 NOTE — ASSESSMENT
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

## 2025-07-16 NOTE — HISTORY OF PRESENT ILLNESS
[FreeTextEntry1] : 64 year old with history of prior inferior MI, secondary cardiogenic shock in 2022. [facility] 2024 - patent stent, non-obstructive CAD.  No recent chest pain, or dyspnea. Good activity tolerance. Exercises regularly, including running. He is scheduled to undergo ophthalmologic procedure.

## 2025-07-16 NOTE — HISTORY OF PRESENT ILLNESS
[FreeTextEntry1] : 64 year old with history of prior inferior MI, secondary cardiogenic shock in 2022. [facility] 2024 - patent stent, non-obstructive CAD.  No recent chest pain, or dyspnea. Good activity tolerance. Exercises regularly, including running. He is scheduled to undergo ophthalmologic procedure.

## 2025-07-16 NOTE — HISTORY OF PRESENT ILLNESS
[FreeTextEntry1] : 64 year old with history of prior inferior MI, secondary cardiogenic shock in 2022. [facility] 2024 - patent stent, non-obstructive CAD.  No recent chest pain, or dyspnea. Good activity tolerance. Exercises regularly, including running. He is scheduled to undergo ophthalmologic procedure.

## 2025-07-28 NOTE — HISTORY OF PRESENT ILLNESS
[FreeTextEntry1] : Pt is a 65 yo M who presented for adrenal insufficiency following hospital discharge 3/2023. -Admitted to [facility] with CP, diagnosed with STEMI s/p stent placement. Transferred to [facility], intubated, on ECMO, and diagnosed with primary adrenal insufficiency due to low cortisol levels on repeat testing and elevated ACTH levels >250. D/c from the hospital 1/31/23 to rehab until 2/15/23. -Was taking fludrocortisone 0.1 mg daily at 7 AM, hydrocortisone 20 mg at 8 am, hydrocortisone 10 mg at 8 PM  -Seen again in the ED due to lightheadedness, nausea, pale, sweaty after a bowel movement with syncopal episode. Cardiac workup negative. D/c home with diagnosis of vasovagal syncope.  -After discharge experienced vomiting and diarrhea for about half a day; otherwise no N/V/D, no decreased appetite, no further weight loss, no salt cravings -No muscle weakness; has been weight lifting  -No family h/o autoimmune disease, adrenal insufficiency  -Prior to hospitalization, no fevers, chills, weight loss, night sweats, TB exposures -Had COVID 10/2022, mild, no hospitalization - Given the scenario that he was diagnosed primary adrenal insufficiency during hospitalization for STEMI with low serum cortisol and elevated ACTH with low CBG and normal Free cortisol without need for florinef until started prior to discharge, recommended decrease dose of hydrocortisone to 10mg in the morning and 5mg in the afternoon along with florinef 0.1mg daily. Has been feeling overall stable on this dose. He held the dose for 2 days prior to labs 5/2023 with evidence of ACTH of 412, undetectable aldosterone and low DHEAS. He did not feel any different off the steroids and there was no hyponatremia, hyperkalemia, or hypoglycemia on the labs at the time off the florinef and hydrocortisone. Now back on the medications.  Main concern has been vocal cord issues causing SOB on exertion with some wheeze with breathing. s/p surgery by ENT. Had been treated with higher doses of prednisone around 4/2023. Had steroid injection for trigger finger 11/2023.  s/p trach with vocal cord surgery in 2025.

## 2025-07-28 NOTE — DATA REVIEWED
Refill authorized per protocol.    
[FreeTextEntry1] : Labs 3/2025: CMP normal except glucose of 107 Lipid Profile at goal A1c 5.4%  Labs 7/2023: A1c 5.4% BMP normal except glucose 122  Labs 6/2023: CBC normal  Chol 105 HDL 32 LDL 35 CMP normal A1c 5.6% TSH 2.49 LH 6.1 FSH 4.5 DHEAS 33.5 Testosterone 830 Aldosterone <3  Labs 5/2023: ACTH 412   Labs 2/2023: TSH 2.92 Cortisol 2.1 Free Cortisol 4.2 CBG 1.5 ACTH 285 Renin 4.397
[FreeTextEntry1] : Labs 3/2025: CMP normal except glucose of 107 Lipid Profile at goal A1c 5.4%  Labs 7/2023: A1c 5.4% BMP normal except glucose 122  Labs 6/2023: CBC normal  Chol 105 HDL 32 LDL 35 CMP normal A1c 5.6% TSH 2.49 LH 6.1 FSH 4.5 DHEAS 33.5 Testosterone 830 Aldosterone <3  Labs 5/2023: ACTH 412   Labs 2/2023: TSH 2.92 Cortisol 2.1 Free Cortisol 4.2 CBG 1.5 ACTH 285 Renin 4.397

## 2025-07-28 NOTE — HISTORY OF PRESENT ILLNESS
[FreeTextEntry1] : Pt is a 65 yo M who presented for adrenal insufficiency following hospital discharge 3/2023. -Admitted to [facility] with CP, diagnosed with STEMI s/p stent placement. Transferred to [facility], intubated, on ECMO, and diagnosed with primary adrenal insufficiency due to low cortisol levels on repeat testing and elevated ACTH levels >250. D/c from the hospital 1/31/23 to rehab until 2/15/23. -Was taking fludrocortisone 0.1 mg daily at 7 AM, hydrocortisone 20 mg at 8 am, hydrocortisone 10 mg at 8 PM  -Seen again in the ED due to lightheadedness, nausea, pale, sweaty after a bowel movement with syncopal episode. Cardiac workup negative. D/c home with diagnosis of vasovagal syncope.  -After discharge experienced vomiting and diarrhea for about half a day; otherwise no N/V/D, no decreased appetite, no further weight loss, no salt cravings -No muscle weakness; has been weight lifting  -No family h/o autoimmune disease, adrenal insufficiency  -Prior to hospitalization, no fevers, chills, weight loss, night sweats, TB exposures -Had COVID 10/2022, mild, no hospitalization - Given the scenario that he was diagnosed primary adrenal insufficiency during hospitalization for STEMI with low serum cortisol and elevated ACTH with low CBG and normal Free cortisol without need for florinef until started prior to discharge, recommended decrease dose of hydrocortisone to 10mg in the morning and 5mg in the afternoon along with florinef 0.1mg daily. Has been feeling overall stable on this dose. He held the dose for 2 days prior to labs 5/2023 with evidence of ACTH of 412, undetectable aldosterone and low DHEAS. He did not feel any different off the steroids and there was no hyponatremia, hyperkalemia, or hypoglycemia on the labs at the time off the florinef and hydrocortisone. Now back on the medications.  Main concern has been vocal cord issues causing SOB on exertion with some wheeze with breathing. s/p surgery by ENT. Had been treated with higher doses of prednisone around 4/2023. Had steroid injection for trigger finger 11/2023.  s/p trach with vocal cord surgery in 2025.

## 2025-07-28 NOTE — ASSESSMENT
[FreeTextEntry1] : 63 y/o M with diagnosed primary adrenal insufficiency during hospitalization for STEMI with low serum cortisol and elevated ACTH with low CBG and normal Free cortisol without need for florinef until started prior to discharge.  Checked ACTH, Aldosterone, DHEAS off glucocorticoids and mineralocorticoids with biochemical evidence of primary AI. Would continue with hydrocortisone 10 mg in the morning and 5 mg in the afternoon with florinef 0.1 mg daily. Can reassess for recovery. Sick day management discussed.  Prep time with review of labs and interval progress notes and consultations  Discussion with patient regarding adrenal insufficiency management plan, treatment options and goals of care answering all patients questions and addressing all concerns  Post-visit completion charting and review  Total Time 30 min

## 2025-07-28 NOTE — ASSESSMENT
[FreeTextEntry1] : 61 y/o M with diagnosed primary adrenal insufficiency during hospitalization for STEMI with low serum cortisol and elevated ACTH with low CBG and normal Free cortisol without need for florinef until started prior to discharge.  Checked ACTH, Aldosterone, DHEAS off glucocorticoids and mineralocorticoids with biochemical evidence of primary AI. Would continue with hydrocortisone 10 mg in the morning and 5 mg in the afternoon with florinef 0.1 mg daily. Can reassess for recovery. Sick day management discussed.  Prep time with review of labs and interval progress notes and consultations  Discussion with patient regarding adrenal insufficiency management plan, treatment options and goals of care answering all patients questions and addressing all concerns  Post-visit completion charting and review  Total Time 30 min

## 2025-07-28 NOTE — PHYSICAL EXAM
[Alert] : alert [Well Nourished] : well nourished [Healthy Appearance] : healthy appearance [No Acute Distress] : no acute distress [Well Developed] : well developed [No Proptosis] : no proptosis [Normal Hearing] : hearing was normal [No Respiratory Distress] : no respiratory distress [No Accessory Muscle Use] : no accessory muscle use [Normal Rate and Effort] : normal respiratory rate and effort [Clear to Auscultation] : lungs were clear to auscultation bilaterally [Normal S1, S2] : normal S1 and S2 [Normal Rate] : heart rate was normal [Regular Rhythm] : with a regular rhythm [No Edema] : no peripheral edema [Normal Bowel Sounds] : normal bowel sounds [Not Tender] : non-tender [Not Distended] : not distended [Soft] : abdomen soft [No Stigmata of Cushings Syndrome] : no stigmata of Cushings Syndrome [No Involuntary Movements] : no involuntary movements were seen [Oriented x3] : oriented to person, place, and time [Normal Affect] : the affect was normal [Normal Mood] : the mood was normal [Kyphosis] : no kyphosis present [de-identified] : +trach [de-identified] : +hyperpigmentation in hand creases

## 2025-07-28 NOTE — REVIEW OF SYSTEMS
[Shortness Of Breath] : shortness of breath [SOB on Exertion] : shortness of breath on exertion [Muscle Weakness] : muscle weakness [All other systems negative] : All other systems negative [Fatigue] : no fatigue [Recent Weight Gain (___ Lbs)] : no recent weight gain [Recent Weight Loss (___ Lbs)] : no recent weight loss [Visual Field Defect] : no visual field defect [Dysphagia] : no dysphagia [Dysphonia] : no dysphonia [Chest Pain] : no chest pain [Palpitations] : no palpitations [Nausea] : no nausea [Constipation] : no constipation [Vomiting] : no vomiting [Diarrhea] : no diarrhea [Polyuria] : no polyuria [Headaches] : no headaches [Tremors] : no tremors [Polydipsia] : no polydipsia [Cold Intolerance] : no cold intolerance [Heat Intolerance] : no heat intolerance

## 2025-07-28 NOTE — PHYSICAL EXAM
[Alert] : alert [Well Nourished] : well nourished [Healthy Appearance] : healthy appearance [No Acute Distress] : no acute distress [Well Developed] : well developed [No Proptosis] : no proptosis [Normal Hearing] : hearing was normal [No Respiratory Distress] : no respiratory distress [No Accessory Muscle Use] : no accessory muscle use [Normal Rate and Effort] : normal respiratory rate and effort [Clear to Auscultation] : lungs were clear to auscultation bilaterally [Normal S1, S2] : normal S1 and S2 [Normal Rate] : heart rate was normal [Regular Rhythm] : with a regular rhythm [No Edema] : no peripheral edema [Normal Bowel Sounds] : normal bowel sounds [Not Tender] : non-tender [Not Distended] : not distended [Soft] : abdomen soft [No Stigmata of Cushings Syndrome] : no stigmata of Cushings Syndrome [No Involuntary Movements] : no involuntary movements were seen [Oriented x3] : oriented to person, place, and time [Normal Affect] : the affect was normal [Normal Mood] : the mood was normal [Kyphosis] : no kyphosis present [de-identified] : +trach [de-identified] : +hyperpigmentation in hand creases